# Patient Record
Sex: MALE | Race: WHITE | NOT HISPANIC OR LATINO | Employment: OTHER | URBAN - METROPOLITAN AREA
[De-identification: names, ages, dates, MRNs, and addresses within clinical notes are randomized per-mention and may not be internally consistent; named-entity substitution may affect disease eponyms.]

---

## 2022-04-21 ENCOUNTER — OFFICE VISIT (OUTPATIENT)
Dept: ORTHOPEDIC SURGERY | Facility: CLINIC | Age: 72
End: 2022-04-21

## 2022-04-21 VITALS
BODY MASS INDEX: 29.96 KG/M2 | DIASTOLIC BLOOD PRESSURE: 80 MMHG | WEIGHT: 214 LBS | SYSTOLIC BLOOD PRESSURE: 112 MMHG | HEIGHT: 71 IN

## 2022-04-21 DIAGNOSIS — M25.512 LEFT SHOULDER PAIN, UNSPECIFIED CHRONICITY: Primary | ICD-10-CM

## 2022-04-21 DIAGNOSIS — M75.102 ROTATOR CUFF SYNDROME, LEFT: ICD-10-CM

## 2022-04-21 PROCEDURE — 99204 OFFICE O/P NEW MOD 45 MIN: CPT | Performed by: ORTHOPAEDIC SURGERY

## 2022-04-21 PROCEDURE — 20610 DRAIN/INJ JOINT/BURSA W/O US: CPT | Performed by: ORTHOPAEDIC SURGERY

## 2022-04-21 RX ORDER — METFORMIN HYDROCHLORIDE 500 MG/1
500 TABLET, EXTENDED RELEASE ORAL 2 TIMES DAILY
COMMUNITY
Start: 2022-01-15

## 2022-04-21 RX ORDER — LIDOCAINE HYDROCHLORIDE 10 MG/ML
3 INJECTION, SOLUTION EPIDURAL; INFILTRATION; INTRACAUDAL; PERINEURAL
Status: COMPLETED | OUTPATIENT
Start: 2022-04-21 | End: 2022-04-21

## 2022-04-21 RX ORDER — HYDROCODONE BITARTRATE AND ACETAMINOPHEN 5; 325 MG/1; MG/1
1 TABLET ORAL EVERY 6 HOURS PRN
COMMUNITY

## 2022-04-21 RX ORDER — FUROSEMIDE 20 MG/1
1 TABLET ORAL DAILY
COMMUNITY

## 2022-04-21 RX ORDER — GLIMEPIRIDE 4 MG/1
4 TABLET ORAL
COMMUNITY

## 2022-04-21 RX ORDER — PROPRANOLOL HYDROCHLORIDE 20 MG/1
10 TABLET ORAL 2 TIMES DAILY
COMMUNITY
Start: 2022-01-14

## 2022-04-21 RX ORDER — TRIAMCINOLONE ACETONIDE 40 MG/ML
40 INJECTION, SUSPENSION INTRA-ARTICULAR; INTRAMUSCULAR
Status: COMPLETED | OUTPATIENT
Start: 2022-04-21 | End: 2022-04-21

## 2022-04-21 RX ORDER — SPIRONOLACTONE 50 MG/1
50 TABLET, FILM COATED ORAL DAILY
COMMUNITY
Start: 2022-01-14

## 2022-04-21 RX ADMIN — LIDOCAINE HYDROCHLORIDE 3 ML: 10 INJECTION, SOLUTION EPIDURAL; INFILTRATION; INTRACAUDAL; PERINEURAL at 10:56

## 2022-04-21 RX ADMIN — TRIAMCINOLONE ACETONIDE 40 MG: 40 INJECTION, SUSPENSION INTRA-ARTICULAR; INTRAMUSCULAR at 10:56

## 2022-04-21 NOTE — PROGRESS NOTES
Procedure   Large Joint Arthrocentesis: L subacromial bursa  Date/Time: 4/21/2022 10:56 AM  Consent given by: patient  Site marked: site marked  Timeout: Immediately prior to procedure a time out was called to verify the correct patient, procedure, equipment, support staff and site/side marked as required   Supporting Documentation  Indications: pain   Procedure Details  Location: shoulder - L subacromial bursa  Preparation: Patient was prepped and draped in the usual sterile fashion  Needle size: 25 G  Approach: posterior  Medications administered: 3 mL lidocaine PF 1% 1 %; 40 mg triamcinolone acetonide 40 MG/ML  Patient tolerance: patient tolerated the procedure well with no immediate complications

## 2022-04-21 NOTE — PROGRESS NOTES
[facility]        Subjective     Pain of the Left Shoulder (NP c/o L Shoulder pain)      ERICKA Guadarrama is a 71 y.o. male who presents with new problem of: left shoulder pain.  Onset: atraumatic and gradual in nature. The issue has been ongoing for 1 year(s). Pain is a 7/10 on the pain scale. Pain is described as aching. Associated symptoms include stiffness and giving way/buckling. The pain is worse with working; heat and pain medication and/or NSAID improve the pain. Previous treatments have included: NSAIDS.    I have reviewed the following portions of the patient's history:History of Present Illness and review of systems.      Patient here today for new problem day regarding his left shoulder.  This has bothered him on and off for about a year.  He has had difficulty anterolaterally.  He has difficulty fishing.  Has minimal pain at rest.  Has difficulty with overhead activity and reaching away from him.      Past Medical History:   Diagnosis Date   • Arthritis of back    • Periarthritis of shoulder       Past Surgical History:   Procedure Laterality Date   • BACK SURGERY  2000    Lower Back, Dr Bill   • NECK SURGERY  1999    Neck Fusion, Dr Bill   • [facility]      History reviewed. No pertinent family history.  Social History     Socioeconomic History   • Marital status:       Current Outpatient Medications on File Prior to Visit   Medication Sig Dispense Refill   • furosemide (LASIX) 20 MG tablet Take 1 tablet by mouth Daily.     • glimepiride (AMARYL) 4 MG tablet Take 4 mg by mouth Every Morning Before Breakfast.     • HYDROcodone-acetaminophen (NORCO)  MG per tablet Take 1 tablet by mouth Every 6 (Six) Hours As Needed for Moderate Pain .     • metFORMIN ER (GLUCOPHAGE-XR) 500 MG 24 hr tablet Take 500 mg by mouth 2 (Two) Times a Day.     • propranolol (INDERAL) 20 MG tablet Take 10 mg by mouth 2 (Two) Times a  "Day.     • spironolactone (ALDACTONE) 50 MG tablet Take 50 mg by mouth Daily.       No current facility-administered medications on file prior to visit.      Allergies   Allergen Reactions   • Lidocaine Swelling     Pt mentions drug allergy is no longer present          Review of Systems     I reviewed the patient's chief complaint, history of present illness, review of systems, past medical history, surgical history, family history, social history, medications and allergy list.        Objective      Physical Exam  /80   Ht 180.3 cm (71\")   Wt 97.1 kg (214 lb)   BMI 29.85 kg/m²     Body mass index is 29.85 kg/m².    General  Mental Status - alert  General Appearance - cooperative, well groomed, not in acute distress  Orientation - Oriented X3  Build & Nutrition - well developed and well nourished  Posture - normal posture  Gait - normal gait       Ortho Exam  Musculoskeletal   Upper Extremity   Left Shoulder     Inspection and Palpation:     Medial Border Scapular Tenderness-none    AC Joint Tenderness -minimal    Sensation is normal    Examination reveals no ecchymosis.        Strength and Tone:    Supraspinatus -4-5 with pain    External Eqqkmecx-8-6 with pain    Infraspinatus - 5/5    Subscapularis -4-5 with pain    Deltoid - 5/5     Range of Motion      Left Shoulder:    Internal Rotation: ROM - L4    External Rotation: AROM - 60 degrees    Elevation through flexion: AROM -100 degrees (130)       Impingement   Left shoulder    Levine-Sushil impingement test positive    Neer impingement test positive     Functional Testing   Left shoulder    AC crossover adduction test negative    Speeds test negative    Uppercut test negative    O'Briens test negative    Drop arm sign negative    Apprehension relocation negative        Imaging/Studies  Imaging Results (Last 24 Hours)     Procedure Component Value Units Date/Time    XR Shoulder 2+ View Left [[id number]] Resulted: 04/21/22 1039     Updated: 04/21/22 1040 "    Narrative:      Left Shoulder X-Ray    Indication: Pain    Study:  AP, axillary lateral, and scapular Y views    Comparison: None    Findings:  No acute fractures are visualized  No bony lesions are visualized.  Normal soft tissue appearance  AC joint: Moderate joint space narrowing  Glenohumeral joint: Minimal joint space narrowing  Acromion type: 2      Impression:    No acute bony abnormalities noted  Type II acromion  Moderate AC joint space narrowing              Assessment    Assessment:  1. Left shoulder pain, unspecified chronicity    2. Rotator cuff syndrome, left        Plan:  1. Continue over-the-counter medication as needed for discomfort  2. 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  He like to be involved with them as much as possible.  We will get a subacromial injection performed today and also get an MRI of his shoulder to further evaluate the condition of his cuff which I believe is likely significantly torn.  See him back to review the results of the MRI.    Procedure Note:    I discussed with the patient the potential benefits of performing a therapeutic injections as well as potential risks including but not limited to infection, swelling, pain, bleeding, bruising, nerve/vessel damage, skin color changes, transient elevation in blood glucose levels, and fat atrophy. After informed consent and after the areas were prepped with chlorhexadine soap, ethyl chloride was used to numb the skin. Via the posterolateral approach, 3mL of 1% lidocaine followed by 40mg of Kenalog were each injected into the subacromial space of the left shoulder. The patient tolerated the procedure well. There were no complications. A sterile dressing was placed over the injection sites.          Cristian Anglin MD  04/21/22  10:52 EDT      Dictated Utilizing Dragon Dictation.

## 2022-05-19 ENCOUNTER — TELEPHONE (OUTPATIENT)
Dept: ORTHOPEDIC SURGERY | Facility: CLINIC | Age: 72
End: 2022-05-19

## 2022-05-19 NOTE — TELEPHONE ENCOUNTER
Called patient about upcoming appointment 5/24, MRI not till 6/21, patient canceled appointment and will call back if he can get earlier MRI appointment in [place].

## 2022-06-03 ENCOUNTER — APPOINTMENT (OUTPATIENT)
Dept: MRI IMAGING | Facility: HOSPITAL | Age: 72
End: 2022-06-03

## 2022-07-11 ENCOUNTER — TELEPHONE (OUTPATIENT)
Dept: ORTHOPEDIC SURGERY | Facility: CLINIC | Age: 72
End: 2022-07-11

## 2022-07-11 NOTE — TELEPHONE ENCOUNTER
Caller: David Guadarrama    Relationship to patient: Self    Best call back number: [phone]    Chief complaint: LEFT SHOULDER PAIN    Type of visit: MRI FOLLOW UP W/ INJECTION    Requested date: ON OR AFTER 7/22/2022    If rescheduling, when is the original appointment: NA    Additional notes: [facility] AFTER [facility] RECEIVE THE ORDERS.

## 2022-07-11 NOTE — TELEPHONE ENCOUNTER
Caller: David Guadarrama    Relationship: Self    Best call back number: [phone]    What orders are you requesting (i.e. lab or imaging): LEFT SHOULDER MRI    In what timeframe would the patient need to come in: ASAP    Where will you receive your lab/imaging services: [facility]

## 2022-07-29 ENCOUNTER — TELEPHONE (OUTPATIENT)
Dept: ORTHOPEDIC SURGERY | Facility: CLINIC | Age: 72
End: 2022-07-29

## 2022-07-29 NOTE — TELEPHONE ENCOUNTER
Caller: AMANDA    Relationship: [facility]     Best call back number: [phone]    What orders are you requesting (i.e. lab or imaging): MRI L/SHOULDER     In what timeframe would the patient need to come in: ASAP     Where will you receive your lab/imaging services: [facility]     Additional notes: PLEASE FAX ORDER  [phone]

## 2022-08-15 ENCOUNTER — TELEPHONE (OUTPATIENT)
Dept: ORTHOPEDIC SURGERY | Facility: CLINIC | Age: 72
End: 2022-08-15

## 2022-08-15 NOTE — TELEPHONE ENCOUNTER
Called patient 8/15 to confirm MRI was complete for this appointment 8/16, LVM, if not done, please reschedule till complete

## 2022-08-16 ENCOUNTER — OFFICE VISIT (OUTPATIENT)
Dept: ORTHOPEDIC SURGERY | Facility: CLINIC | Age: 72
End: 2022-08-16

## 2022-08-16 VITALS
BODY MASS INDEX: 28.98 KG/M2 | WEIGHT: 207 LBS | HEIGHT: 71 IN | DIASTOLIC BLOOD PRESSURE: 74 MMHG | SYSTOLIC BLOOD PRESSURE: 110 MMHG

## 2022-08-16 DIAGNOSIS — M75.112 INCOMPLETE TEAR OF LEFT ROTATOR CUFF, UNSPECIFIED WHETHER TRAUMATIC: ICD-10-CM

## 2022-08-16 DIAGNOSIS — M19.012 ARTHRITIS OF LEFT ACROMIOCLAVICULAR JOINT: ICD-10-CM

## 2022-08-16 DIAGNOSIS — M25.512 LEFT SHOULDER PAIN, UNSPECIFIED CHRONICITY: Primary | ICD-10-CM

## 2022-08-16 DIAGNOSIS — M75.102 ROTATOR CUFF SYNDROME, LEFT: ICD-10-CM

## 2022-08-16 PROCEDURE — 99213 OFFICE O/P EST LOW 20 MIN: CPT | Performed by: ORTHOPAEDIC SURGERY

## 2022-08-16 PROCEDURE — 20610 DRAIN/INJ JOINT/BURSA W/O US: CPT | Performed by: ORTHOPAEDIC SURGERY

## 2022-08-16 RX ORDER — TRIAMCINOLONE ACETONIDE 40 MG/ML
40 INJECTION, SUSPENSION INTRA-ARTICULAR; INTRAMUSCULAR
Status: COMPLETED | OUTPATIENT
Start: 2022-08-16 | End: 2022-08-16

## 2022-08-16 RX ORDER — LIDOCAINE HYDROCHLORIDE 10 MG/ML
3 INJECTION, SOLUTION EPIDURAL; INFILTRATION; INTRACAUDAL; PERINEURAL
Status: COMPLETED | OUTPATIENT
Start: 2022-08-16 | End: 2022-08-16

## 2022-08-16 RX ADMIN — TRIAMCINOLONE ACETONIDE 40 MG: 40 INJECTION, SUSPENSION INTRA-ARTICULAR; INTRAMUSCULAR at 15:30

## 2022-08-16 RX ADMIN — LIDOCAINE HYDROCHLORIDE 3 ML: 10 INJECTION, SOLUTION EPIDURAL; INFILTRATION; INTRACAUDAL; PERINEURAL at 15:30

## 2022-08-16 NOTE — PROGRESS NOTES
"    [facility] [place] [facility] Note        Subjective     CC: Follow-up of the Left Shoulder (After MRI 08/03/2022)      ERICKA Guadarrama is a 71 y.o. male.  Patient is here today for follow-up after the MRI was done on 8/3/2022.  At his last visit on 4/21/2022, he is getting ready go to a fishing tournament and requested an injection.  This was given and he got good relief until a few weeks ago.    Overall, patient's symptoms are as above.    ROS:    Constiutional:Pt denies fever, chills, nausea, or vomiting.  MSK:as above        Objective      Past Medical History  Past Medical History:   Diagnosis Date   • Arthritis of back    • Periarthritis of shoulder          Physical Exam  /74   Ht 180.3 cm (70.98\")   Wt 93.9 kg (207 lb)   BMI 28.88 kg/m²     Body mass index is 28.88 kg/m².    Patient is well nourished and well developed.        Ortho Exam  Musculoskeletal   Upper Extremity   Left Shoulder       Strength and Tone:    Supraspinatus -4 out of 5 with pain    External Rotators-5/5    Infraspinatus - 5/5    Subscapularis -4-5 with pain    Deltoid - 5/5     Range of Motion      Left Shoulder:    Internal Rotation: ROM - L4    External Rotation: AROM - 70 degrees    Elevation through flexion: AROM - 140 degrees     AC joint:  non tender to palpation    AC joint:  negative crossover          Imaging/Labs/EMG Reviewed:  Imaging Results (Last 24 Hours)     ** No results found for the last 24 hours. **      We have reviewed images and report of an MRI of the patient's left shoulder from EMR and see dated 8/3/2022.  Patient has a partial-thickness tear of the distal supraspinatus on 1-2 cuts only.  No significant full-thickness injury noted.  There are degenerative changes noted at the AC joint.      Assessment    Assessment:  1. Left shoulder pain, unspecified chronicity    2. Rotator cuff syndrome, left    3. Incomplete tear of left rotator cuff, unspecified whether traumatic    4. Arthritis of " left acromioclavicular joint        Plan:  1. Recommend over the counter anti-inflammatories for pain and/or swelling  2. Partial-thickness left rotator cuff tear with AC joint arthritis--repeat subacromial injection will be given.  We will send the patient to physical therapy at [facility].  I will see him back in about 3 to 4 months we will see how he is doing overall.  Would not recommend surgical intervention at this point given the appearance of the tear.    Procedure Note:    I discussed with the patient the potential benefits of performing a therapeutic injections as well as potential risks including but not limited to infection, swelling, pain, bleeding, bruising, nerve/vessel damage, skin color changes, transient elevation in blood glucose levels, and fat atrophy. After informed consent and after the areas were prepped with chlorhexadine soap, ethyl chloride was used to numb the skin. Via the posterolateral approach, 3mL of 1% lidocaine followed by 40mg of Kenalog were each injected into the subacromial space of the left shoulder. The patient tolerated the procedure well. There were no complications. A sterile dressing was placed over the injection sites.        Cristian Anglin MD  08/16/22  17:24 EDT      Dictated Utilizing Dragon Dictation.

## 2022-08-16 NOTE — PROGRESS NOTES
Procedure   Large Joint Arthrocentesis: L subacromial bursa  Date/Time: 8/16/2022 3:30 PM  Consent given by: patient  Site marked: site marked  Timeout: Immediately prior to procedure a time out was called to verify the correct patient, procedure, equipment, support staff and site/side marked as required   Supporting Documentation  Indications: pain   Procedure Details  Location: shoulder - L subacromial bursa  Preparation: Patient was prepped and draped in the usual sterile fashion  Needle size: 25 G  Approach: posterior  Medications administered: 3 mL lidocaine PF 1% 1 %; 40 mg triamcinolone acetonide 40 MG/ML  Patient tolerance: patient tolerated the procedure well with no immediate complications

## 2022-08-17 ENCOUNTER — TELEPHONE (OUTPATIENT)
Dept: ORTHOPEDIC SURGERY | Facility: CLINIC | Age: 72
End: 2022-08-17

## 2022-08-17 NOTE — TELEPHONE ENCOUNTER
LVM with pt letting him know we are going to upload his disc that he brought in yesterday but wanting to know if he would like for us to mail his disc back to him or shred it. Requested he give us a call back to let us know.    LUIS ALFREDO Koehler

## 2022-12-20 ENCOUNTER — OFFICE VISIT (OUTPATIENT)
Dept: ORTHOPEDIC SURGERY | Facility: CLINIC | Age: 72
End: 2022-12-20

## 2022-12-20 VITALS
DIASTOLIC BLOOD PRESSURE: 76 MMHG | SYSTOLIC BLOOD PRESSURE: 112 MMHG | WEIGHT: 213 LBS | BODY MASS INDEX: 29.82 KG/M2 | HEIGHT: 71 IN

## 2022-12-20 DIAGNOSIS — M75.112 INCOMPLETE TEAR OF LEFT ROTATOR CUFF, UNSPECIFIED WHETHER TRAUMATIC: ICD-10-CM

## 2022-12-20 DIAGNOSIS — M75.102 ROTATOR CUFF SYNDROME, LEFT: ICD-10-CM

## 2022-12-20 DIAGNOSIS — M25.512 LEFT SHOULDER PAIN, UNSPECIFIED CHRONICITY: ICD-10-CM

## 2022-12-20 DIAGNOSIS — M75.20 TENDINITIS OF LONG HEAD OF BICEPS: Primary | ICD-10-CM

## 2022-12-20 PROCEDURE — 99213 OFFICE O/P EST LOW 20 MIN: CPT | Performed by: ORTHOPAEDIC SURGERY

## 2022-12-20 PROCEDURE — 20610 DRAIN/INJ JOINT/BURSA W/O US: CPT | Performed by: ORTHOPAEDIC SURGERY

## 2022-12-20 RX ORDER — LIDOCAINE HYDROCHLORIDE 10 MG/ML
3 INJECTION, SOLUTION EPIDURAL; INFILTRATION; INTRACAUDAL; PERINEURAL
Status: COMPLETED | OUTPATIENT
Start: 2022-12-20 | End: 2022-12-20

## 2022-12-20 RX ORDER — TRIAMCINOLONE ACETONIDE 40 MG/ML
40 INJECTION, SUSPENSION INTRA-ARTICULAR; INTRAMUSCULAR
Status: COMPLETED | OUTPATIENT
Start: 2022-12-20 | End: 2022-12-20

## 2022-12-20 RX ADMIN — TRIAMCINOLONE ACETONIDE 40 MG: 40 INJECTION, SUSPENSION INTRA-ARTICULAR; INTRAMUSCULAR at 10:13

## 2022-12-20 RX ADMIN — LIDOCAINE HYDROCHLORIDE 3 ML: 10 INJECTION, SOLUTION EPIDURAL; INFILTRATION; INTRACAUDAL; PERINEURAL at 10:13

## 2022-12-20 NOTE — PROGRESS NOTES
"    [facility]        Subjective     CC: Follow-up (4 months- Left shoulder pain)      HPI    David Guadarrama is a 72 y.o. male.  Patient is here today with his wife for follow-up of his left shoulder pain.  He has a partial-thickness rotator cuff tear proven by an MRI on 8/3/2022.  Only a small bursal sided tear that we felt could be treated nonoperatively.  He has some AC joint arthritis.  Patient has also what appears to be an upper subscap tear on the sagittal and axial images of his MRI.  Patient has been having some pain in the front of the shoulder and into his biceps muscle belly as of late and has difficulty when he lifts anything heavy.    Overall, patient's symptoms are as above.    ROS:    Constiutional:Pt denies fever, chills, nausea, or vomiting.  MSK:as above        Objective      Past Medical History  Past Medical History:   Diagnosis Date   • Arthritis of back    • Periarthritis of shoulder      Social History     Socioeconomic History   • Marital status:           Physical Exam  /76   Ht 180.3 cm (70.98\")   Wt 96.6 kg (213 lb)   BMI 29.72 kg/m²     Body mass index is 29.72 kg/m².    Patient is well nourished and well developed.        Ortho Exam  Musculoskeletal   Upper Extremity   Left Shoulder       Strength and Tone:    Supraspinatus -5 out of 5    External Rotators-5/5    Infraspinatus - 5/5    Subscapularis -4+ out of 5 with pain with Palm press but not belly press.  Pain with bearhug.    Deltoid - 5/5     Range of Motion      Left Shoulder:    Internal Rotation: ROM - L4    External Rotation: AROM - 70 degrees    Elevation through flexion: AROM - 140 degrees     AC joint:  non tender to palpation    AC joint:  negative crossover    Positive speeds        Imaging/Labs/EMG Reviewed:  Imaging Results (Last 24 Hours)     ** No results found for the last 24 hours. **            Assessment    Assessment:  1. Tendinitis of long head of biceps    2. Rotator " cuff syndrome, left    3. Left shoulder pain, unspecified chronicity    4. Incomplete tear of left rotator cuff, unspecified whether traumatic        Plan:  1. Recommend over the counter anti-inflammatories for pain and/or swelling  2. Likely upper subscap tear with long head of biceps tendinopathy in the face of AC joint arthritis and supraspinatus partial-thickness tear--plan is for diagnostic and therapeutic injection into the glenohumeral joint of the left shoulder.  See him back in 3 to 4 months to assess efficacy.    Procedure Note:    I discussed with the patient the potential benefits of performing a therapeutic injections as well as potential risks including but not limited to infection, swelling, pain, bleeding, bruising, nerve/vessel damage, skin color changes, transient elevation in blood glucose levels, and fat atrophy. After informed consent and after the areas were prepped with chlorhexadine soap, ethyl chloride was used to numb the skin. Via the anterior approach, 3mL of 1% lidocaine followed by 40mg of Kenalog were each injected into the glenohumeral joint of the left shoulder. The patient tolerated the procedure well. There were no complications. A sterile dressing was placed over the injection sites.        Cristian Anglin MD  12/20/22  10:10 EST      Dictated Utilizing Dragon Dictation.

## 2022-12-20 NOTE — PROGRESS NOTES
Procedure   Large Joint Arthrocentesis: L glenohumeral  Date/Time: 12/20/2022 10:13 AM  Consent given by: patient  Site marked: site marked  Timeout: Immediately prior to procedure a time out was called to verify the correct patient, procedure, equipment, support staff and site/side marked as required   Supporting Documentation  Indications: pain   Procedure Details  Location: shoulder - L glenohumeral  Preparation: Patient was prepped and draped in the usual sterile fashion  Needle size: 25 G  Approach: posterior  Medications administered: 3 mL lidocaine PF 1% 1 %; 40 mg triamcinolone acetonide 40 MG/ML  Patient tolerance: patient tolerated the procedure well with no immediate complications

## 2023-03-21 ENCOUNTER — OFFICE VISIT (OUTPATIENT)
Dept: ORTHOPEDIC SURGERY | Facility: CLINIC | Age: 73
End: 2023-03-21
Payer: MEDICARE

## 2023-03-21 VITALS — BODY MASS INDEX: 27.27 KG/M2 | HEIGHT: 71 IN | WEIGHT: 194.8 LBS

## 2023-03-21 DIAGNOSIS — M75.20 TENDINITIS OF LONG HEAD OF BICEPS: Primary | ICD-10-CM

## 2023-03-21 DIAGNOSIS — M75.112 INCOMPLETE TEAR OF LEFT ROTATOR CUFF, UNSPECIFIED WHETHER TRAUMATIC: ICD-10-CM

## 2023-03-21 DIAGNOSIS — M19.012 ARTHRITIS OF LEFT ACROMIOCLAVICULAR JOINT: ICD-10-CM

## 2023-03-21 DIAGNOSIS — M75.102 ROTATOR CUFF SYNDROME, LEFT: ICD-10-CM

## 2023-03-21 PROCEDURE — 99213 OFFICE O/P EST LOW 20 MIN: CPT | Performed by: ORTHOPAEDIC SURGERY

## 2023-03-21 RX ORDER — PANTOPRAZOLE SODIUM 40 MG/1
40 TABLET, DELAYED RELEASE ORAL DAILY
COMMUNITY

## 2023-03-21 RX ORDER — ONDANSETRON 4 MG/1
4 TABLET, FILM COATED ORAL EVERY 8 HOURS PRN
COMMUNITY

## 2023-03-21 RX ORDER — MIDODRINE HYDROCHLORIDE 2.5 MG/1
2.5 TABLET ORAL
COMMUNITY

## 2023-03-21 RX ORDER — LACTULOSE 10 G/15ML
20 SOLUTION ORAL 2 TIMES DAILY PRN
COMMUNITY

## 2023-03-21 NOTE — PROGRESS NOTES
"    [facility] [place] [facility] Note        Subjective     CC: Follow-up (3 month follow up - Tendinitis of long head of biceps )      ERICKA Guadarrama is a 72 y.o. male.  Patient is here today for follow-up of his left shoulder pain.  Last seen on 12/20/2022, he was injected for diagnostic and therapeutic purposes in the glenohumeral joint.  He says he got tremendous relief from the injection and also from the physical therapy that was done at the time.  He says he feels like a lot of the effects of worn off but he is overall doing much better with the left shoulder.  Has been having some issues with his heart rate and blood pressure as of late.    Overall, patient's symptoms are as above    ROS:    Constiutional:Pt denies fever, chills, nausea, or vomiting.  MSK:as above        Objective      Past Medical History  Past Medical History:   Diagnosis Date   • Arthritis of back    • Periarthritis of shoulder      Social History     Socioeconomic History   • Marital status:    Tobacco Use   • Smoking status: Never   • Smokeless tobacco: Never   Vaping Use   • Vaping Use: Never used   Substance and Sexual Activity   • Alcohol use: Never   • Drug use: Never   • Sexual activity: Defer          Physical Exam  Ht 180.3 cm (70.98\")   Wt 88.4 kg (194 lb 12.8 oz)   BMI 27.18 kg/m²     Body mass index is 27.18 kg/m².    Patient is well nourished and well developed.        Ortho Exam  Musculoskeletal   Upper Extremity   Left Shoulder     Inspection and Palpation:     Medial Border Scapular Tenderness-none    AC Joint Tenderness -none    Sensation is normal    Examination reveals no ecchymosis.        Strength and Tone:    Supraspinatus - 5/5    External Rotators-5/5    Infraspinatus - 5/5    Subscapularis -4 out of 5    Deltoid - 5/5     Range of Motion      Left Shoulder:    Internal Rotation: ROM - L4    External Rotation: AROM - 60 degrees    Elevation through flexion: AROM -125 degrees "        Impingement   Left shoulder    Levine-Sushil impingement test negative    Neer impingement test negative     Functional Testing   Left shoulder    AC crossover adduction test negative    Speeds test positive    Uppercut test negative    O'Briens test negative    Drop arm sign negative    Apprehension relocation negative        Imaging/Labs/EMG Reviewed:  Imaging Results (Last 24 Hours)     ** No results found for the last 24 hours. **            Assessment    Assessment:  1. Tendinitis of long head of biceps    2. Rotator cuff syndrome, left    3. Incomplete tear of left rotator cuff, unspecified whether traumatic    4. Arthritis of left acromioclavicular joint        Plan:  1. Recommend over the counter anti-inflammatories for pain and/or swelling  2. Chronic right shoulder pain with AC joint arthritis and long of the biceps tendinopathy with upper subscap tear--plan to be for observation at this point.  We recommended that he call his PCP, Dr. Chisholm, if his heart rate and blood pressure remain low and his medication can be adjusted versus requiring further work-up.  I will leave follow-up open-ended and if he requires repeat modalities into the glenohumeral joint, he can simply call and we can work him in within a day or 2.      Cristian Anglin MD  03/21/23  10:15 EDT      Dictated Utilizing Dragon Dictation.

## 2023-05-01 ENCOUNTER — TELEPHONE (OUTPATIENT)
Dept: ORTHOPEDIC SURGERY | Facility: CLINIC | Age: 73
End: 2023-05-01

## 2023-05-01 NOTE — TELEPHONE ENCOUNTER
Caller: PATIENT     Relationship to patient: SELF    Best call back number: [phone]    Chief complaint: LEFT SHOULDER PAIN GETTING WORSE     Type of visit: INJECTION     Requested date: ASAP

## 2023-08-05 ENCOUNTER — HOSPITAL ENCOUNTER (OUTPATIENT)
Dept: MRI IMAGING | Facility: HOSPITAL | Age: 73
Discharge: HOME | End: 2023-08-05
Payer: MEDICARE

## 2023-08-05 ENCOUNTER — HOSPITAL ENCOUNTER (OUTPATIENT)
Dept: GENERAL RADIOLOGY | Facility: HOSPITAL | Age: 73
Discharge: HOME | End: 2023-08-05
Payer: MEDICARE

## 2023-08-05 DIAGNOSIS — M75.102 ROTATOR CUFF SYNDROME, LEFT: ICD-10-CM

## 2023-08-05 DIAGNOSIS — M19.012 ARTHRITIS OF LEFT ACROMIOCLAVICULAR JOINT: ICD-10-CM

## 2023-08-05 DIAGNOSIS — M75.20 TENDINITIS OF LONG HEAD OF BICEPS: ICD-10-CM

## 2023-08-05 DIAGNOSIS — M75.112 INCOMPLETE TEAR OF LEFT ROTATOR CUFF, UNSPECIFIED WHETHER TRAUMATIC: ICD-10-CM

## 2023-08-05 DIAGNOSIS — S49.92XA INJURY OF LEFT SHOULDER, INITIAL ENCOUNTER: ICD-10-CM

## 2023-08-05 PROCEDURE — 70200 X-RAY EXAM OF EYE SOCKETS: CPT

## 2023-08-05 PROCEDURE — 73221 MRI JOINT UPR EXTREM W/O DYE: CPT

## 2023-08-05 PROCEDURE — 73502 X-RAY EXAM HIP UNI 2-3 VIEWS: CPT

## 2023-08-10 ENCOUNTER — OFFICE VISIT (OUTPATIENT)
Dept: ORTHOPEDIC SURGERY | Facility: CLINIC | Age: 73
End: 2023-08-10
Payer: MEDICARE

## 2023-08-10 VITALS
HEIGHT: 69 IN | BODY MASS INDEX: 30.51 KG/M2 | DIASTOLIC BLOOD PRESSURE: 58 MMHG | WEIGHT: 206 LBS | SYSTOLIC BLOOD PRESSURE: 118 MMHG

## 2023-08-10 DIAGNOSIS — M75.112 INCOMPLETE TEAR OF LEFT ROTATOR CUFF, UNSPECIFIED WHETHER TRAUMATIC: ICD-10-CM

## 2023-08-10 DIAGNOSIS — M75.20 TENDINITIS OF LONG HEAD OF BICEPS: Primary | ICD-10-CM

## 2023-08-10 PROCEDURE — 99214 OFFICE O/P EST MOD 30 MIN: CPT | Performed by: ORTHOPAEDIC SURGERY

## 2023-08-10 NOTE — PROGRESS NOTES
"    [facility] [place] [facility] Note        Subjective     CC: Follow-up (1 month MRI f/u (8/5/23)-- tendinitis of long head of biceps; Rotator cuff syndrome, left Incomplete tear of left rotator cuff,)      ERICKA    David Guadarrama is a 72 y.o. male.  Patient is here today with his wife for follow-up of the MRI of his left shoulder.  He says overall the injection that we gave him on 7/10/2023 has helped him quite a bit.  He was having severe pain at that time.    Overall, patient's symptoms are as above.    ROS:    Constiutional:Pt denies fever, chills, nausea, or vomiting.  MSK:as above        Objective      Past Medical History  Past Medical History:   Diagnosis Date    Arthritis of back     Periarthritis of shoulder      Social History     Socioeconomic History    Marital status:    Tobacco Use    Smoking status: Never     Passive exposure: Yes    Smokeless tobacco: Never    Tobacco comments:     dont recall dates on starting and quiting.   Vaping Use    Vaping Use: Never used   Substance and Sexual Activity    Alcohol use: Not Currently     Alcohol/week: 21.0 standard drinks     Types: 21 Shots of liquor per week     Comment: averaged 3 per day mostly mixed drinks    Drug use: Never    Sexual activity: Not Currently     Partners: Female     Birth control/protection: Hysterectomy          Physical Exam  /58   Ht 176.5 cm (69.49\")   Wt 93.4 kg (206 lb)   BMI 30.00 kg/mý     Body mass index is 30 kg/mý.    Patient is well nourished and well developed.        Ortho Exam  Active forward elevation to 60 degrees.  Passively I can get him to 140 degrees with pain.  He abduction to 90 degrees.  He has 5 out of 5 subscap testing.  3 out of 5 supraspinatus    Imaging/Labs/EMG Reviewed:  Imaging Results (Last 24 Hours)       ** No results found for the last 24 hours. **          MRI Shoulder Left Without Contrast  Narrative: MRI SHOULDER LEFT WO CONTRAST    Date of Exam: 8/5/2023 11:38 AM " EDT    Indication: Shoulder pain, rotator cuff disorder suspected, xray done.     Comparison: Shoulder x-ray 4/21/2022    Technique:  Routine multiplanar/multisequence images of the left shoulder were obtained without contrast administration.      Findings:  Rotator cuff:  Supraspinatus and infraspinatus tendon: There is a small intrasubstance tear measuring 4 mm from medial to lateral by 4 mm anterior to posterior at the posterior supraspinatus/anterior spinatus insertion site junction. There is mild to moderate   tendinosis of the infraspinatus tendon.  Subscapularis tendon: No tendon abnormality. No significant muscle atrophy.  Teres minor tendon: No tendon abnormality. No significant muscle atrophy.    Glenohumeral joint:  Humeral head is centrally located within the glenoid. Physiologic joint fluid is present. Mild cartilage narrowing. The inferior joint capsule is normal thickness and normal signal intensity.    Labrum:  No displaced labral tears. No paralabral cysts.    Biceps tendon:  Long head of the biceps tendon not well seen. Tiny fibers are seen within the bicipital groove. Majority of the tendon appears to be torn.    Acromioclavicular Joint:  Normal for age. No abnormal bone marrow edema. No os acromiale. No significant lateral downsloping of the acromion.    Bone:  No fractures or aggressive osseous lesions. Bone marrow signal intensity is normal.    Miscellaneous:  No significant subacromial subdeltoid fluid. No pathologically enlarged axillary lymph nodes. Deltoid muscle has normal size and signal intensity.  Impression: 1.There is mild glenohumeral joint arthritis.  2.There is a small intrasubstance tear of the posterior supraspinatus/anterior spinatus tendon junction.  3.There is high-grade partial-thickness tearing of the proximal head biceps tendon. However majority of tendon fibers appear to be torn.    Electronically Signed: Corine Harley    8/7/2023 10:19 AM EDT    Workstation ID: [id number]      We have reviewed images and report of the MRI above.  Patient does not appear to have any full-thickness injuries.  There is some tendinopathy and partial tearing of the upper subscap.  There is tendinopathy and partial tearing of the long of the biceps.  There is partial tearing of the supraspinatus as well.    Assessment    Assessment:  1. Tendinitis of long head of biceps    2. Incomplete tear of left rotator cuff, unspecified whether traumatic        Plan:  Recommend over the counter anti-inflammatories for pain and/or swelling  Partial-thickness left rotator cuff tear with long of the biceps tendinopathy--at this point, his tearing is not sufficient to explain his dysfunction in the shoulder.  Given his medical comorbidities, I recommended to him and his wife to try and use selective injections as much as possible until he just cannot stand it.  I do not think that there is anything on his MRI that would drive me to be aggressive with regards to surgical recommendations.  I will see him back in about 2 to 3 months we will reassess his shoulder and consider modalities.  I told him that he needs to get clearance from his PCP and his gastroenterologist before we can consider surgical intervention as well.      Cristian Anglin MD  08/10/23  17:00 EDT      Dictated Utilizing Dragon Dictation.

## 2023-10-10 ENCOUNTER — OFFICE VISIT (OUTPATIENT)
Dept: ORTHOPEDIC SURGERY | Facility: CLINIC | Age: 73
End: 2023-10-10
Payer: MEDICARE

## 2023-10-10 VITALS
DIASTOLIC BLOOD PRESSURE: 70 MMHG | HEIGHT: 69 IN | SYSTOLIC BLOOD PRESSURE: 120 MMHG | BODY MASS INDEX: 30.57 KG/M2 | WEIGHT: 206.4 LBS

## 2023-10-10 DIAGNOSIS — M19.012 ARTHRITIS OF LEFT ACROMIOCLAVICULAR JOINT: ICD-10-CM

## 2023-10-10 DIAGNOSIS — M75.112 INCOMPLETE TEAR OF LEFT ROTATOR CUFF, UNSPECIFIED WHETHER TRAUMATIC: ICD-10-CM

## 2023-10-10 DIAGNOSIS — M75.20 TENDINITIS OF LONG HEAD OF BICEPS: Primary | ICD-10-CM

## 2023-10-10 PROCEDURE — 99212 OFFICE O/P EST SF 10 MIN: CPT | Performed by: ORTHOPAEDIC SURGERY

## 2023-10-10 RX ORDER — HYDROCODONE BITARTRATE AND ACETAMINOPHEN 10; 325 MG/1; MG/1
TABLET ORAL
COMMUNITY
Start: 2023-10-06

## 2023-10-10 NOTE — PROGRESS NOTES
"    [facility] [place] [facility] Note        Subjective     CC: Follow-up (2 month follow up - Tendinitis of long head of biceps)      ERICKA Guadarrama is a 73 y.o. male.  Patient is here with his wife for follow-up of his left shoulder.  At his last visit, we encouraged him to treat his problem nonoperatively.  Did well with a glenohumeral injection given in July 2023.  He says his shoulder has not bothered him as of late.    Overall, patient's symptoms are as above.    ROS:    Constiutional:Pt denies fever, chills, nausea, or vomiting.  MSK:as above        Objective      Past Medical History  Past Medical History:   Diagnosis Date    Arthritis of back     Periarthritis of shoulder      Social History     Socioeconomic History    Marital status:    Tobacco Use    Smoking status: Former     Packs/day: 0.50     Years: 20.00     Additional pack years: 0.00     Total pack years: 10.00     Types: Cigarettes     Passive exposure: Yes    Smokeless tobacco: Never    Tobacco comments:     dont recall dates on starting and quiting.   Vaping Use    Vaping Use: Never used   Substance and Sexual Activity    Alcohol use: Not Currently     Alcohol/week: 21.0 standard drinks of alcohol     Types: 21 Shots of liquor per week     Comment: averaged 3 per day mostly mixed drinks    Drug use: Never    Sexual activity: Not Currently     Partners: Female     Birth control/protection: Hysterectomy          Physical Exam  /70   Ht 176.5 cm (69.49\")   Wt 93.6 kg (206 lb 6.4 oz)   BMI 30.05 kg/mý     Body mass index is 30.05 kg/mý.    Patient is well nourished and well developed.        Ortho Exam  Musculoskeletal   Upper Extremity   Left Shoulder       Strength and Tone:    Supraspinatus -5 out of 5    External Rotators-5/5    Infraspinatus - 5/5    Subscapularis - 5/5    Deltoid - 5/5     Range of Motion      Left Shoulder:    Internal Rotation: ROM - L4    External Rotation: AROM - 70 degrees    Elevation through " flexion: AROM - 140 degrees     AC joint:  non tender to palpation    AC joint:  negative crossover        Imaging/Labs/EMG Reviewed:  Imaging Results (Last 24 Hours)       ** No results found for the last 24 hours. **              Assessment    Assessment:  1. Tendinitis of long head of biceps    2. Incomplete tear of left rotator cuff, unspecified whether traumatic    3. Arthritis of left acromioclavicular joint        Plan:  Recommend over the counter anti-inflammatories for pain and/or swelling  Partial-thickness upper subscap tear with long of the biceps tendinopathy in the face of AC joint arthritis--at this point we will continue to encourage nonoperative treatment.  We will hold off on injection for now.  He will call back if he has a flareup in a glenohumeral injection can be given to him.  Follow-up as needed otherwise.      Cristian Anglin MD  10/10/23  13:14 EDT      Dictated Utilizing Dragon Dictation.

## 2023-11-14 ENCOUNTER — OFFICE VISIT (OUTPATIENT)
Dept: ORTHOPEDIC SURGERY | Facility: CLINIC | Age: 73
End: 2023-11-14
Payer: MEDICARE

## 2023-11-14 VITALS
DIASTOLIC BLOOD PRESSURE: 78 MMHG | WEIGHT: 208.8 LBS | SYSTOLIC BLOOD PRESSURE: 122 MMHG | BODY MASS INDEX: 30.93 KG/M2 | HEIGHT: 69 IN

## 2023-11-14 DIAGNOSIS — M75.20 TENDINITIS OF LONG HEAD OF BICEPS: Primary | ICD-10-CM

## 2023-11-14 DIAGNOSIS — M19.012 ARTHRITIS OF LEFT ACROMIOCLAVICULAR JOINT: ICD-10-CM

## 2023-11-14 DIAGNOSIS — M75.112 INCOMPLETE TEAR OF LEFT ROTATOR CUFF, UNSPECIFIED WHETHER TRAUMATIC: ICD-10-CM

## 2023-11-14 PROCEDURE — 20610 DRAIN/INJ JOINT/BURSA W/O US: CPT | Performed by: ORTHOPAEDIC SURGERY

## 2023-11-14 RX ORDER — TRIAMCINOLONE ACETONIDE 40 MG/ML
40 INJECTION, SUSPENSION INTRA-ARTICULAR; INTRAMUSCULAR
Status: COMPLETED | OUTPATIENT
Start: 2023-11-14 | End: 2023-11-14

## 2023-11-14 RX ORDER — LIDOCAINE HYDROCHLORIDE 10 MG/ML
3 INJECTION, SOLUTION EPIDURAL; INFILTRATION; INTRACAUDAL; PERINEURAL
Status: COMPLETED | OUTPATIENT
Start: 2023-11-14 | End: 2023-11-14

## 2023-11-14 RX ADMIN — TRIAMCINOLONE ACETONIDE 40 MG: 40 INJECTION, SUSPENSION INTRA-ARTICULAR; INTRAMUSCULAR at 10:49

## 2023-11-14 RX ADMIN — LIDOCAINE HYDROCHLORIDE 3 ML: 10 INJECTION, SOLUTION EPIDURAL; INFILTRATION; INTRACAUDAL; PERINEURAL at 10:49

## 2023-11-14 NOTE — PROGRESS NOTES
[facility] [place] [facility] Note        Subjective     CC: Follow-up (1 month follow-up: Tendinitis of long head of biceps)      ERICKA Guadarrama is a 73 y.o. male.  Patient returns to the office today for a flareup of his left shoulder pain.  Has done well with glenohumeral injections in the past.  Has had a recent flareup and is requesting an injection today.    Overall, patient's symptoms are as above.    ROS:    Constiutional:Pt denies fever, chills, nausea, or vomiting.  MSK:as above        Objective      Past Medical History  Past Medical History:   Diagnosis Date    Arthritis of back     Periarthritis of shoulder      Social History     Socioeconomic History    Marital status:    Tobacco Use    Smoking status: Former     Packs/day: 0.50     Years: 20.00     Additional pack years: 0.00     Total pack years: 10.00     Types: Cigarettes     Passive exposure: Yes    Smokeless tobacco: Never    Tobacco comments:     dont recall dates on starting and quiting.   Vaping Use    Vaping Use: Never used   Substance and Sexual Activity    Alcohol use: Not Currently     Alcohol/week: 21.0 standard drinks of alcohol     Types: 21 Shots of liquor per week     Comment: averaged 3 per day mostly mixed drinks    Drug use: Never    Sexual activity: Not Currently     Partners: Female     Birth control/protection: Hysterectomy        Assessment    Assessment:  1. Tendinitis of long head of biceps    2. Incomplete tear of left rotator cuff, unspecified whether traumatic    3. Arthritis of left acromioclavicular joint        Plan:  Recommend over the counter anti-inflammatories for pain and/or swelling  Left shoulder pain with partial-thickness rotator cuff tear, AC joint arthritis, and long of the biceps tendinopathy--glenohumeral injection will be given today.  Follow-up as needed going forward.      Procedure Note:    I discussed with the patient the potential benefits of performing a therapeutic injections  as well as potential risks including but not limited to infection, swelling, pain, bleeding, bruising, nerve/vessel damage, skin color changes, transient elevation in blood glucose levels, and fat atrophy. After informed consent and after the areas were prepped with chlorhexadine soap, ethyl chloride was used to numb the skin. Via the anterior approach, 3mL of 1% lidocaine followed by 40mg of Kenalog were each injected into the glenohumeral joint of the left shoulder. The patient tolerated the procedure well. There were no complications. A sterile dressing was placed over the injection sites.        Cristian Anglin MD  11/14/23  16:54 EST      Dictated Utilizing Dragon Dictation.

## 2023-11-14 NOTE — PROGRESS NOTES
Procedure   - Large Joint Arthrocentesis: L glenohumeral on 11/14/2023 10:49 AM  Indications: pain  Details: (23 G) needle, anterior approach  Medications: 3 mL lidocaine PF 1% 1 %; 40 mg triamcinolone acetonide 40 MG/ML  Outcome: tolerated well, no immediate complications  Procedure, treatment alternatives, risks and benefits explained, specific risks discussed. Consent was given by the patient. Immediately prior to procedure a time out was called to verify the correct patient, procedure, equipment, support staff and site/side marked as required. Patient was prepped and draped in the usual sterile fashion.

## 2024-03-14 ENCOUNTER — OFFICE VISIT (OUTPATIENT)
Dept: ORTHOPEDIC SURGERY | Facility: CLINIC | Age: 74
End: 2024-03-14
Payer: MEDICARE

## 2024-03-14 VITALS
BODY MASS INDEX: 30.21 KG/M2 | DIASTOLIC BLOOD PRESSURE: 78 MMHG | HEIGHT: 69 IN | SYSTOLIC BLOOD PRESSURE: 140 MMHG | WEIGHT: 204 LBS

## 2024-03-14 DIAGNOSIS — M25.512 LEFT SHOULDER PAIN, UNSPECIFIED CHRONICITY: Primary | ICD-10-CM

## 2024-03-14 DIAGNOSIS — M75.112 INCOMPLETE TEAR OF LEFT ROTATOR CUFF, UNSPECIFIED WHETHER TRAUMATIC: ICD-10-CM

## 2024-03-14 DIAGNOSIS — M75.20 TENDINITIS OF LONG HEAD OF BICEPS: ICD-10-CM

## 2024-03-14 PROCEDURE — 1159F MED LIST DOCD IN RCRD: CPT | Performed by: PHYSICIAN ASSISTANT

## 2024-03-14 PROCEDURE — 99214 OFFICE O/P EST MOD 30 MIN: CPT | Performed by: PHYSICIAN ASSISTANT

## 2024-03-14 PROCEDURE — 1160F RVW MEDS BY RX/DR IN RCRD: CPT | Performed by: PHYSICIAN ASSISTANT

## 2024-03-14 PROCEDURE — 20610 DRAIN/INJ JOINT/BURSA W/O US: CPT | Performed by: PHYSICIAN ASSISTANT

## 2024-03-14 RX ADMIN — TRIAMCINOLONE ACETONIDE 40 MG: 40 INJECTION, SUSPENSION INTRA-ARTICULAR; INTRAMUSCULAR at 09:15

## 2024-03-14 RX ADMIN — LIDOCAINE HYDROCHLORIDE 4 ML: 10 INJECTION, SOLUTION EPIDURAL; INFILTRATION; INTRACAUDAL; PERINEURAL at 09:15

## 2024-03-14 NOTE — PROGRESS NOTES
[facility] [place] [facility] Note    Subjective     Chief Complaint   Patient presents with    Left Shoulder - Pain        HPI  David Guadarrama is a 73 y.o. male.  Established patient presents for evaluation of left shoulder pain.  Patient previously seen Dr. Anglin for his shoulder and has had glenohumeral corticosteroid injection (anterior approach).  Patient reports the injections worked well but pain is returning and increasing/worsening.  He is hoping to get something done before he starts chemo next Tuesday.    Pain scale: 6/10.  Associated symptoms pain, stiffness.  Activity related to pain sleeping, rising from a seated position, movement of joint.      Denies fever, chills, night sweats or other constitutional symptoms.    Past Medical History:   Diagnosis Date    Arthritis of back     Liver cancer 12/01/2023    Periarthritis of shoulder       Past Surgical History:   Procedure Laterality Date    BACK SURGERY  2000    Lower Back, Dr Bill    HEPATIC ARTERY STENT      NECK SURGERY  1999    Neck Fusion, Dr Bill    SHOULDER SURGERY Right 2018    Right Sholder. Dr Anglin      History reviewed. No pertinent family history.  Social History     Socioeconomic History    Marital status:    Tobacco Use    Smoking status: Former     Current packs/day: 0.50     Average packs/day: 0.5 packs/day for 20.0 years (10.0 ttl pk-yrs)     Types: Cigarettes     Passive exposure: Yes    Smokeless tobacco: Never    Tobacco comments:     dont recall dates on starting and quiting.   Vaping Use    Vaping status: Never Used   Substance and Sexual Activity    Alcohol use: Not Currently     Alcohol/week: 21.0 standard drinks of alcohol     Types: 21 Shots of liquor per week     Comment: averaged 3 per day mostly mixed drinks    Drug use: Never    Sexual activity: Not Currently     Partners: Female     Birth control/protection: Hysterectomy      Current Outpatient Medications on File Prior to Visit   Medication Sig  "Dispense Refill    furosemide (LASIX) 20 MG tablet Take 1 tablet by mouth Daily.      glimepiride (AMARYL) 4 MG tablet Take 1 tablet by mouth 2 (Two) Times a Day.      HYDROcodone-acetaminophen (NORCO)  MG per tablet TAKE 1/2 TO 1 TABLET BY MOUTH EVERY 6 TO 8 HOURS AS NEEDED FOR PAIN      lactulose (CHRONULAC) 10 GM/15ML solution Take 30 mL by mouth 2 (Two) Times a Day As Needed.      metFORMIN ER (GLUCOPHAGE-XR) 500 MG 24 hr tablet Take 1 tablet by mouth 2 (Two) Times a Day.      midodrine (PROAMATINE) 2.5 MG tablet Take 1 tablet by mouth 3 (Three) Times a Day Before Meals.      ondansetron (ZOFRAN) 4 MG tablet Take 1 tablet by mouth Every 8 (Eight) Hours As Needed for Nausea or Vomiting.      pantoprazole (PROTONIX) 40 MG EC tablet Take 1 tablet by mouth Daily.      propranolol (INDERAL) 20 MG tablet Take 0.5 tablets by mouth 2 (Two) Times a Day.      spironolactone (ALDACTONE) 50 MG tablet Take 1 tablet by mouth Daily.       No current facility-administered medications on file prior to visit.      No Known Allergies     The following portions of the patient's history were reviewed and updated as appropriate: allergies, current medications, past family history, past medical history, past social history, past surgical history, and problem list.    Review of Systems   Constitutional: Negative.    HENT: Negative.     Eyes: Negative.    Respiratory: Negative.     Cardiovascular: Negative.    Gastrointestinal: Negative.    Endocrine: Negative.    Genitourinary: Negative.    Musculoskeletal:  Positive for arthralgias.   Skin: Negative.    Allergic/Immunologic: Negative.    Neurological: Negative.    Hematological: Negative.    Psychiatric/Behavioral: Negative.          Objective      Physical Exam  /78   Ht 176.5 cm (69.49\")   Wt 92.5 kg (204 lb)   BMI 29.70 kg/m²     Body mass index is 29.7 kg/m².    GENERAL APPEARANCE: awake, alert & oriented x 3, in no acute distress and well developed, well " nourished  PSYCH: normal mood and affect  LUNGS:  breathing nonlabored, no wheezing  EYES: sclera anicteric, pupils equal  CARDIOVASCULAR: palpable pulses. Capillary refill less than 2 seconds  INTEGUMENTARY: skin intact, no clubbing, cyanosis  NEUROLOGIC:  Normal Sensation         Ortho Exam  Left shoulder  Skin: Grossly intact without any redness, warmth or swelling.  Tenderness: Predominantly anterior shoulder, biceps tendon/groove  Motion: Forward flexion 140 degrees, abduction 90 degrees external rotation 65 degrees, internal rotation L5.  Testing: Levine positive, Neer's positive, empty can positive  Motor: Grossly intact axillary, MSC, radial, ulnar, median, AIN, PIN.  Sensory: Grossly intact to light touch axillary, MSC, radial, ulnar, median nerve distributions.      Imaging/Studies  Ordered left shoulder plain films.  Imaging read/interpreted by Dr. Anglin.     Imaging Results (Last 7 Days)       Procedure Component Value Units Date/Time    XR Shoulder 2+ View Left [[id number]] Resulted: 03/14/24 0928     Updated: 03/14/24 0930    Narrative:      Left Shoulder X-Ray    Indication: Pain    Study:  Grashey AP, axillary lateral, and scapular Y views.  Verified with   x-ray tech that this is indeed a left AP despite flipped image    Comparison: Left shoulder 4/21/2022    Findings:  No acute fractures are visualized  No bony lesions are visualized.  Normal soft tissue appearance  AC joint: Moderate to severe joint space narrowing  Glenohumeral joint: Minimal joint space narrowing  Acromion type: 2  Increase signal greater tuberosity consistent with chronic cuff pathology.    Subtle radiodensity superior to the humeral head that could be consistent   with calcific tendinopathy.  Clinical correlation is recommended.      Impression:    No acute bony abnormalities noted  Possible calcific tendinopathy right rotator cuff  Type II acromion  Increase signal greater tuberosity consistent with chronic cuff pathology             Assessment/Plan        ICD-10-CM ICD-9-CM   1. Left shoulder pain, unspecified chronicity  M25.512 719.41   2. Tendinitis of long head of biceps  M75.20 726.12   3. Incomplete tear of left rotator cuff, unspecified whether traumatic  M75.112 840.4       Orders Placed This Encounter   Procedures    Large Joint Arthrocentesis: L glenohumeral    XR Shoulder 2+ View Left        -Left shoulder pain (worsening) due to biceps tendinitis, incomplete rotator cuff tear.  -Reviewed imaging with the patient.  -Recommend shoulder exercises for range of motion and cuff/deltoid strengthening.  -Offered and accepted glenohumeral corticosteroid injection. Injection given today.  -Will have to PCP or gastroenterology manage pain medication due to patient's history of hepatocellular carcinoma.  -Follow up 4 months.  -Questions and concerns answered.    After discussing the risks, benefits, indications of injection, the patient gave consent to proceed.  Left shoulder glenohumeral joint was confirmed as the correct site to be injected with a timeout.  It was then prepped using Hibiclens and injected with a mixture of 4 cc of 1% plain lidocaine and 1 cc of Kenalog (40 mg per mL), without any resistance through the anterior approach, patient in seated position.  Area was cleaned, hemostasis was achieved and a Band-Aid was applied over the injection site.  The patient tolerated procedure well.  I instructed the patient on signs and symptoms of infection.  They should report to the emergency department or return to clinic if any of these develop, for further evaluation and treatment.  Recommended modifying activity for the next 48 hours to include rest, ice, elevation and oral pain medication as needed.        Jennifer Pike PA-C  03/18/24  14:04 EDT               EMR Dragon/Transcription disclaimer:  Much of this encounter note is an electronic transcription of spoken language to printed text. Electronic transcription of  spoken language may permit erroneous, or at times, nonsensical words or phrases to be inadvertently transcribed. Although I have reviewed the note for such errors, some may still exist.

## 2024-03-18 RX ORDER — TRIAMCINOLONE ACETONIDE 40 MG/ML
40 INJECTION, SUSPENSION INTRA-ARTICULAR; INTRAMUSCULAR
Status: COMPLETED | OUTPATIENT
Start: 2024-03-14 | End: 2024-03-14

## 2024-03-18 RX ORDER — LIDOCAINE HYDROCHLORIDE 10 MG/ML
4 INJECTION, SOLUTION EPIDURAL; INFILTRATION; INTRACAUDAL; PERINEURAL
Status: COMPLETED | OUTPATIENT
Start: 2024-03-14 | End: 2024-03-14

## 2024-06-13 ENCOUNTER — OFFICE VISIT (OUTPATIENT)
Dept: ORTHOPEDIC SURGERY | Facility: CLINIC | Age: 74
End: 2024-06-13
Payer: MEDICARE

## 2024-06-13 VITALS
WEIGHT: 200 LBS | BODY MASS INDEX: 29.62 KG/M2 | DIASTOLIC BLOOD PRESSURE: 64 MMHG | HEIGHT: 69 IN | SYSTOLIC BLOOD PRESSURE: 118 MMHG

## 2024-06-13 DIAGNOSIS — M75.20 TENDINITIS OF LONG HEAD OF BICEPS: ICD-10-CM

## 2024-06-13 DIAGNOSIS — M25.512 LEFT SHOULDER PAIN, UNSPECIFIED CHRONICITY: Primary | ICD-10-CM

## 2024-06-13 DIAGNOSIS — M75.112 INCOMPLETE TEAR OF LEFT ROTATOR CUFF, UNSPECIFIED WHETHER TRAUMATIC: ICD-10-CM

## 2024-06-13 RX ORDER — LIDOCAINE HYDROCHLORIDE 10 MG/ML
3 INJECTION, SOLUTION EPIDURAL; INFILTRATION; INTRACAUDAL; PERINEURAL
Status: COMPLETED | OUTPATIENT
Start: 2024-06-13 | End: 2024-06-13

## 2024-06-13 RX ORDER — TRIAMCINOLONE ACETONIDE 40 MG/ML
40 INJECTION, SUSPENSION INTRA-ARTICULAR; INTRAMUSCULAR
Status: COMPLETED | OUTPATIENT
Start: 2024-06-13 | End: 2024-06-13

## 2024-06-13 RX ADMIN — TRIAMCINOLONE ACETONIDE 40 MG: 40 INJECTION, SUSPENSION INTRA-ARTICULAR; INTRAMUSCULAR at 09:42

## 2024-06-13 RX ADMIN — LIDOCAINE HYDROCHLORIDE 3 ML: 10 INJECTION, SOLUTION EPIDURAL; INFILTRATION; INTRACAUDAL; PERINEURAL at 09:42

## 2024-06-13 NOTE — PROGRESS NOTES
Procedure   - Large Joint Arthrocentesis: L glenohumeral on 6/13/2024 9:42 AM  Indications: pain  Details: (23g) needle, anterior approach  Medications: 3 mL lidocaine PF 1% 1 %; 40 mg triamcinolone acetonide 40 MG/ML  Outcome: tolerated well, no immediate complications  Procedure, treatment alternatives, risks and benefits explained, specific risks discussed. Consent was given by the patient. Immediately prior to procedure a time out was called to verify the correct patient, procedure, equipment, support staff and site/side marked as required. Patient was prepped and draped in the usual sterile fashion.

## 2024-06-13 NOTE — PROGRESS NOTES
[facility] [place] [facility] Clinic Note        Subjective     CC: Follow-up (Left shoulder pain)      HPI    David Guadarrama is a 73 y.o. male.  Patient is here today with his wife for worsening left shoulder pain.  Last seen 3/14/2024 and injected in the glenohumeral joint by Jennifer.  Shoulder pain is worsening.  Patient is in the midst of getting treatment for hepatocellular carcinoma.    Overall, patient's symptoms are as above    ROS:    Constiutional:Pt denies fever, chills, nausea, or vomiting.  MSK:as above        Objective      Past Medical History  Past Medical History:   Diagnosis Date    Arthritis of back     Liver cancer 12/01/2023    Periarthritis of shoulder      Social History     Socioeconomic History    Marital status:    Tobacco Use    Smoking status: Former     Current packs/day: 0.50     Average packs/day: 0.5 packs/day for 20.0 years (10.0 ttl pk-yrs)     Types: Cigarettes     Passive exposure: Yes    Smokeless tobacco: Never    Tobacco comments:     dont recall dates on starting and quiting.   Vaping Use    Vaping status: Never Used   Substance and Sexual Activity    Alcohol use: Not Currently     Alcohol/week: 21.0 standard drinks of alcohol     Types: 21 Shots of liquor per week     Comment: averaged 3 per day mostly mixed drinks    Drug use: Never    Sexual activity: Not Currently     Partners: Female     Birth control/protection: Hysterectomy          Imaging/Labs/EMG Reviewed and Interpreted:  Imaging Results (Last 24 Hours)       ** No results found for the last 24 hours. **              Assessment    Assessment:  1. Left shoulder pain, unspecified chronicity    2. Tendinitis of long head of biceps    3. Incomplete tear of left rotator cuff, unspecified whether traumatic        Plan:  Recommend over the counter anti-inflammatories for pain and/or swelling  Left shoulder pain with long of the biceps tendinopathy and partial-thickness rotator cuff tear--repeat steroid injection  will be given today.  Follow-up in 4 months.    Procedure Note:    I discussed with the patient the potential benefits of performing a therapeutic injections as well as potential risks including but not limited to infection, swelling, pain, bleeding, bruising, nerve/vessel damage, skin color changes, transient elevation in blood glucose levels, and fat atrophy. After informed consent and after the areas were prepped with chlorhexadine soap, ethyl chloride was used to numb the skin. Via the anterior approach, 3mL of 1% lidocaine followed by 40mg of Kenalog were each injected into the glenohumeral joint of the left shoulder. The patient tolerated the procedure well. There were no complications. A sterile dressing was placed over the injection sites.        Cristian Anglin MD  06/13/24  10:18 EDT      Dictated Utilizing Dragon Dictation.

## 2025-05-19 NOTE — PROGRESS NOTES
Procedure   Large Joint Arthrocentesis: L glenohumeral  Date/Time: 3/14/2024 9:15 AM  Consent given by: patient  Site marked: site marked  Timeout: Immediately prior to procedure a time out was called to verify the correct patient, procedure, equipment, support staff and site/side marked as required   Supporting Documentation  Indications: pain   Procedure Details  Location: shoulder - L glenohumeral  Preparation: Patient was prepped and draped in the usual sterile fashion  Needle gauge: 21g.  Approach: anterior  Medications administered: 4 mL lidocaine PF 1% 1 %; 40 mg triamcinolone acetonide 40 MG/ML  Patient tolerance: patient tolerated the procedure well with no immediate complications        [Negative] : Heme/Lymph